# Patient Record
Sex: FEMALE | Race: BLACK OR AFRICAN AMERICAN | NOT HISPANIC OR LATINO | Employment: FULL TIME | URBAN - METROPOLITAN AREA
[De-identification: names, ages, dates, MRNs, and addresses within clinical notes are randomized per-mention and may not be internally consistent; named-entity substitution may affect disease eponyms.]

---

## 2017-02-22 ENCOUNTER — OFFICE VISIT (OUTPATIENT)
Dept: ENDOCRINOLOGY | Facility: MEDICAL CENTER | Age: 52
End: 2017-02-22
Payer: COMMERCIAL

## 2017-02-22 VITALS
WEIGHT: 210 LBS | OXYGEN SATURATION: 90 % | HEART RATE: 84 BPM | BODY MASS INDEX: 33.75 KG/M2 | DIASTOLIC BLOOD PRESSURE: 80 MMHG | SYSTOLIC BLOOD PRESSURE: 142 MMHG | HEIGHT: 66 IN

## 2017-02-22 DIAGNOSIS — E04.2 MULTIPLE THYROID NODULES: ICD-10-CM

## 2017-02-22 PROCEDURE — 99244 OFF/OP CNSLTJ NEW/EST MOD 40: CPT | Performed by: INTERNAL MEDICINE

## 2017-02-22 RX ORDER — ALLOPURINOL 300 MG/1
300 TABLET ORAL DAILY
COMMUNITY
End: 2023-10-04

## 2017-02-22 RX ORDER — CHOLECALCIFEROL (VITAMIN D3) 125 MCG
2000 CAPSULE ORAL DAILY
COMMUNITY

## 2017-02-22 RX ORDER — METOPROLOL SUCCINATE 50 MG/1
50 TABLET, EXTENDED RELEASE ORAL DAILY
COMMUNITY
End: 2023-10-04

## 2017-02-22 RX ORDER — LOSARTAN POTASSIUM 100 MG/1
100 TABLET ORAL DAILY
COMMUNITY

## 2017-02-22 RX ORDER — PRAVASTATIN SODIUM 10 MG
10 TABLET ORAL DAILY
COMMUNITY

## 2017-02-22 NOTE — MR AVS SNAPSHOT
"        Angelica Alberts   2017 3:00 PM   Office Visit   MRN: [id number]    Department:  Endocrinology Med [facility]   Dept Phone:  [phone]    Description:  Female : 1965   Provider:  Timo Harris M.D.           Allergies as of 2017     Allergen Noted Reactions    Penicillins 2017   Hives, Swelling    Sulfa Drugs 2017   Hives, Swelling      Vital Signs     Blood Pressure Pulse Height Weight Body Mass Index Oxygen Saturation    142/80 mmHg 84 1.664 m (5' 5.5\") 95.255 kg (210 lb) 34.40 kg/m2 90%    Smoking Status                   Smoker, Current Status Unknown           Basic Information     Date Of Birth Sex Race Ethnicity Preferred Language    1965 Female Black or  Non- English      Your appointments     Mar 21, 2017  3:20 PM   Established Patient with Timo Harris M.D.   [facility]    [address] [place] [address], [address]  [facility] [id number] [phone]           You will be receiving a confirmation call a few days before your appointment from our automated call confirmation system.              Health Maintenance     Patient has no pending health maintenance at this time      Current Immunizations     No immunizations on file.      Below and/or attached are the medications your provider expects you to take. Review all of your home medications and newly ordered medications with your provider and/or pharmacist. Follow medication instructions as directed by your provider and/or pharmacist. Please keep your medication list with you and share with your provider. Update the information when medications are discontinued, doses are changed, or new medications (including over-the-counter products) are added; and carry medication information at all times in the event of emergency situations     Allergies:  PENICILLINS - Hives,Swelling     SULFA DRUGS - Hives,Swelling               Medications  Valid as of: February " 22, 2017 -  3:44 PM    Generic Name Brand Name Tablet Size Instructions for use    Allopurinol (Tab) ZYLOPRIM 300 MG Take 300 mg by mouth every day.        Cholecalciferol (Tab) Vitamin D3 2000 UNITS Take  by mouth every day.        Losartan Potassium (Tab) COZAAR 100 MG Take 100 mg by mouth every day.        MetFORMIN HCl (Tab) GLUCOPHAGE 1000 MG Take 1,000 mg by mouth 2 times a day, with meals.        Metoprolol Succinate (TABLET SR 24 HR) TOPROL XL 50 MG Take 50 mg by mouth every day.        Pravastatin Sodium (Tab) PRAVACHOL 10 MG Take 10 mg by mouth every day.        .                 Medicines prescribed today were sent to:     [facility]/PHARMACY #[id number] - [place], [place] - [address] [facility] AT [address] [address]    Phone: [phone] Fax: [phone]    Open 24 Hours?: No      Medication refill instructions:       If your prescription bottle indicates you have medication refills left, it is not necessary to call your provider’s office. Please contact your pharmacy and they will refill your medication.    If your prescription bottle indicates you do not have any refills left, you may request refills at any time through one of the following ways: The online Dizkon system (except Urgent Care), by calling your provider’s office, or by asking your pharmacy to contact your provider’s office with a refill request. Medication refills are processed only during regular business hours and may not be available until the next business day. Your provider may request additional information or to have a follow-up visit with you prior to refilling your medication.   *Please Note: Medication refills are assigned a new Rx number when refilled electronically. Your pharmacy may indicate that no refills were authorized even though a new prescription for the same medication is available at the pharmacy. Please request the medicine by name with the pharmacy before contacting your provider for a  refill.           [employer] Access Code: [email or web address]  Expires: 3/24/2017  3:44 PM    [employer]  A secure, online tool to manage your health information     [employer]’s [employer]® is a secure, online tool that connects you to your personalized health information from the privacy of your home -- day or night - making it very easy for you to manage your healthcare. Once the activation process is completed, you can even access your medical information using the [employer] taylor, which is available for free in the Apple Taylor store or Google Play store.     [employer] provides the following levels of access (as shown below):   My Chart Features   [facility] Primary Care Doctor [facility]  Specialists [facility]  Urgent  Care Non-[facility]  Primary Care  Doctor   Email your healthcare team securely and privately 24/7 X X X    Manage appointments: schedule your next appointment; view details of past/upcoming appointments X      Request prescription refills. X      View recent personal medical records, including lab and immunizations X X X X   View health record, including health history, allergies, medications X X X X   Read reports about your outpatient visits, procedures, consult and ER notes X X X X   See your discharge summary, which is a recap of your hospital and/or ER visit that includes your diagnosis, lab results, and care plan. X X       How to register for [employer]:  1. Go to  [email or web address].  2. Click on the Sign Up Now box, which takes you to the New Member Sign Up page. You will need to provide the following information:  a. Enter your [employer] Access Code exactly as it appears at the top of this page. (You will not need to use this code after you’ve completed the sign-up process. If you do not sign up before the expiration date, you must request a new code.)   b. Enter your date of birth.   c. Enter your home email address.   d. Click Submit, and follow the next screen’s instructions.  3. Create a [employer] ID. This will  be your [employer] login ID and cannot be changed, so think of one that is secure and easy to remember.  4. Create a [employer] password. You can change your password at any time.  5. Enter your Password Reset Question and Answer. This can be used at a later time if you forget your password.   6. Enter your e-mail address. This allows you to receive e-mail notifications when new information is available in [employer].  7. Click Sign Up. You can now view your health information.    For assistance activating your [employer] account, call [phone]

## 2017-02-23 NOTE — PROGRESS NOTES
Chief Complaint   Patient presents with   • Thyroid Problem        CHIEF COMPLAINT:      Endocrine evaluation requested by Dr. Powers for this 51 year old lady with thyroid nodules.      History of present illness  The patient was discovered to have thyroid nodules about two years ago.  I do not have the original notes related to that or the original ultrasound done at [facility].  However, a significant nodule was found in the right thyroid lobe which was ultimately biopsied in March 2015.  Result of that biopsy was a benign follicular nodule.  The gland itself has been asymptomatic until recently but causes no local symptoms.  She has been clinically euthyroid taking no thyroid hormone.  She does not have a previous history of any thyroid difficulty.  There is no family history of thyroid problems or thyroid cancer.  She had a follow up thyroid ultrasound done in August 2016.  The nodule that was biopsied in the right lobe had decreased in size down to 6 mm.  There were other scattered small benign cystic nodules in both lobes.  None of the other nodules had significantly increased or change in size or characteristics.  The nodules otherwise have appeared benign.     Over the past one month, the patient has been very aware of a persistent pain and tenderness in the area of the left thyroid lobe.  I can confirm that on examination.  It does not feel particularly enlarged but it is definitely sensitive on palpation in that area.  It also is painful when she rotates her head to the left.  The area of concern seems to be right at the area of the left thyroid lobe.     I have suggested that we expedite a repeat thyroid ultrasound to see if perhaps there is an enlargement of one of the cysts or hemorrhage into a cyst that might account for this discomfort.  That would be the first obvious thing to look for.  If an ultrasound does not indicate the problem, then a CT scan of the area would be next appropriately.      She seems to be euthyroid otherwise.  We have thyroid blood tests and general chemistries done February 7.  Her TSH was 1.0 and free thyroxin mid range at 2.1.  Also TPO antibody was normal at 13 (0-34).  A thyroglobulin antibody also negative.    I will speak with her again after we see a new thyroid ultrasound and make a determination about what to do next.     ROS:   Constitutional  menopausal symptoms   Eyes   vision stable  ENT    no pain or epistaxis, no unusual congestion  Cardiac   no angina, no arrhythmia  Respiratory   no hemoptysis, no unusual dyspnea, no cough  GI    GERD , IBS with constipation     no voiding symptoms  Musculoskeletal    no unusual or new pains  Skin   keratoses  Neuro  no unusual weakness or loss of function  Psych   appears alert and appropriate  Lymph   no new or unusual lumps or nodules      Allergies:   Allergies   Allergen Reactions   • Penicillins Hives and Swelling   • Sulfa Drugs Hives and Swelling       Current medicines including changes today:  Current Outpatient Prescriptions   Medication Sig Dispense Refill   • allopurinol (ZYLOPRIM) 300 MG Tab Take 300 mg by mouth every day.     • metformin (GLUCOPHAGE) 1000 MG tablet Take 1,000 mg by mouth 2 times a day, with meals.     • pravastatin (PRAVACHOL) 10 MG Tab Take 10 mg by mouth every day.     • metoprolol SR (TOPROL XL) 50 MG TABLET SR 24 HR Take 50 mg by mouth every day.     • losartan (COZAAR) 100 MG Tab Take 100 mg by mouth every day.     • Cholecalciferol (VITAMIN D3) 2000 UNITS Tab Take  by mouth every day.       No current facility-administered medications for this visit.        Past Medical History   Diagnosis Date   • Multiple thyroid nodules    • Hypertension    • Diabetes mellitus (CMS-HCC)    • Hyperlipidemia    • S/P cholecystectomy 2005   • S/P hysterectomy 1988       Family history         No family history of thyroid disease or thyroid cancer    Social history          Patient is           Works  "as   [employer]    PHYSICAL EXAM:    /80 mmHg  Pulse 84  Ht 1.664 m (5' 5.5\")  Wt 95.255 kg (210 lb)  BMI 34.40 kg/m2  SpO2 90%    Gen.   appears healthy     Skin   appropriate for sex and age    HEENT  unremarkable    Neck   definite tenderness in the area of the left thyroid lobe. Difficult to palpate an actual nodule or other abnormality in the area    Heart  regular    Extremities  no edema    Neuro  gait and station normal    Psych  appropriate      ASSESSMENT AND RECOMMENDATIONS    1. Multiple thyroid nodules             Pain and tenderness in the area of the left thyroid lobe             Soft tissue neck ultrasound and report results by phone      DISPOSITION: Return in about 1 month (around 3/22/2017).       Timo Harris M.D.    Copies to: Najma Herrera M.D. [phone]  "

## 2017-03-08 ENCOUNTER — TELEPHONE (OUTPATIENT)
Dept: ENDOCRINOLOGY | Facility: MEDICAL CENTER | Age: 52
End: 2017-03-08

## 2017-03-08 NOTE — TELEPHONE ENCOUNTER
----- Message from Timo Harris M.D. sent at 3/7/2017  8:19 PM PST -----  Thyroid subcentimeter nodules are unchanged. We will discuss this at R March 21 appointment.    Timo Harris M.D.

## 2017-03-21 ENCOUNTER — OFFICE VISIT (OUTPATIENT)
Dept: ENDOCRINOLOGY | Facility: MEDICAL CENTER | Age: 52
End: 2017-03-21
Payer: COMMERCIAL

## 2017-03-21 VITALS
WEIGHT: 212 LBS | SYSTOLIC BLOOD PRESSURE: 134 MMHG | HEART RATE: 79 BPM | HEIGHT: 66 IN | BODY MASS INDEX: 34.07 KG/M2 | DIASTOLIC BLOOD PRESSURE: 82 MMHG | OXYGEN SATURATION: 92 %

## 2017-03-21 DIAGNOSIS — M54.2 NECK PAIN ON LEFT SIDE: ICD-10-CM

## 2017-03-21 DIAGNOSIS — E04.2 MULTIPLE THYROID NODULES: ICD-10-CM

## 2017-03-21 DIAGNOSIS — Z13.9 SCREENING PROCEDURE: ICD-10-CM

## 2017-03-21 PROCEDURE — 99213 OFFICE O/P EST LOW 20 MIN: CPT | Performed by: INTERNAL MEDICINE

## 2017-03-21 NOTE — MR AVS SNAPSHOT
"        Angelica Alberts   3/21/2017 3:20 PM   Office Visit   MRN: [id number]    Department:  Endocrinology Med [facility]   Dept Phone:  [phone]    Description:  Female : 1965   Provider:  Timo Harris M.D.           Allergies as of 3/21/2017     Allergen Noted Reactions    Penicillins 2017   Hives, Swelling    Sulfa Drugs 2017   Hives, Swelling      Vital Signs     Blood Pressure Pulse Height Weight Body Mass Index Oxygen Saturation    134/82 mmHg 79 1.664 m (5' 5.5\") 96.163 kg (212 lb) 34.73 kg/m2 92%    Smoking Status                   Smoker, Current Status Unknown           Basic Information     Date Of Birth Sex Race Ethnicity Preferred Language    1965 Female Black or  Non- English      Your appointments     Sep 19, 2017  3:20 PM   Established Patient with Timo Harris M.D.   [facility]    [address] [place] [address], [address]  [facility] [id number] [phone]           You will be receiving a confirmation call a few days before your appointment from our automated call confirmation system.              Problem List              ICD-10-CM Priority Class Noted - Resolved    Multiple thyroid nodules E04.2   2017 - Present      Health Maintenance        Date Due Completion Dates    IMM DTaP/Tdap/Td Vaccine (1 - Tdap) 1984 ---    PAP SMEAR 1986 ---    MAMMOGRAM 2005 ---    COLONOSCOPY 2015 ---    IMM INFLUENZA (1) 2016 ---            Current Immunizations     No immunizations on file.      Below and/or attached are the medications your provider expects you to take. Review all of your home medications and newly ordered medications with your provider and/or pharmacist. Follow medication instructions as directed by your provider and/or pharmacist. Please keep your medication list with you and share with your provider. Update the information when medications are discontinued, doses are changed, " or new medications (including over-the-counter products) are added; and carry medication information at all times in the event of emergency situations     Allergies:  PENICILLINS - Hives,Swelling     SULFA DRUGS - Hives,Swelling               Medications  Valid as of: March 21, 2017 -  3:39 PM    Generic Name Brand Name Tablet Size Instructions for use    Allopurinol (Tab) ZYLOPRIM 300 MG Take 300 mg by mouth every day.        Cholecalciferol (Tab) Vitamin D3 2000 UNITS Take  by mouth every day.        Losartan Potassium (Tab) COZAAR 100 MG Take 100 mg by mouth every day.        MetFORMIN HCl (Tab) GLUCOPHAGE 1000 MG Take 1,000 mg by mouth 2 times a day, with meals.        Metoprolol Succinate (TABLET SR 24 HR) TOPROL XL 50 MG Take 50 mg by mouth every day.        Pravastatin Sodium (Tab) PRAVACHOL 10 MG Take 10 mg by mouth every day.        .                 Medicines prescribed today were sent to:     [facility]/PHARMACY #[id number] - [place], [place] - [address] [facility] AT [address] [address]    Phone: [phone] Fax: [phone]    Open 24 Hours?: No      Medication refill instructions:       If your prescription bottle indicates you have medication refills left, it is not necessary to call your provider’s office. Please contact your pharmacy and they will refill your medication.    If your prescription bottle indicates you do not have any refills left, you may request refills at any time through one of the following ways: The online VMTurbo system (except Urgent Care), by calling your provider’s office, or by asking your pharmacy to contact your provider’s office with a refill request. Medication refills are processed only during regular business hours and may not be available until the next business day. Your provider may request additional information or to have a follow-up visit with you prior to refilling your medication.   *Please Note: Medication refills are assigned a new Rx  number when refilled electronically. Your pharmacy may indicate that no refills were authorized even though a new prescription for the same medication is available at the pharmacy. Please request the medicine by name with the pharmacy before contacting your provider for a refill.           Refresh[employer] Access Code: [email or web address]  Expires: 3/24/2017  4:44 PM    Refresh[employer]  A secure, online tool to manage your health information     [employer]’s Refresh[employer]io® is a secure, online tool that connects you to your personalized health information from the privacy of your home -- day or night - making it very easy for you to manage your healthcare. Once the activation process is completed, you can even access your medical information using the Refresh[employer]io taylor, which is available for free in the Apple Taylor store or Google Play store.     Refresh[employer]io provides the following levels of access (as shown below):   My Chart Features   [facility] Primary Care Doctor [facility]  Specialists [facility]  Urgent  Care Non-[facility]  Primary Care  Doctor   Email your healthcare team securely and privately 24/7 X X X    Manage appointments: schedule your next appointment; view details of past/upcoming appointments X      Request prescription refills. X      View recent personal medical records, including lab and immunizations X X X X   View health record, including health history, allergies, medications X X X X   Read reports about your outpatient visits, procedures, consult and ER notes X X X X   See your discharge summary, which is a recap of your hospital and/or ER visit that includes your diagnosis, lab results, and care plan. X X       How to register for Ref[employer].io:  1. Go to  [email or web address].  2. Click on the Sign Up Now box, which takes you to the New Member Sign Up page. You will need to provide the following information:  a. Enter your Refresh[employer]io Access Code exactly as it appears at the top of this page. (You will not need to use this code after you’ve  completed the sign-up process. If you do not sign up before the expiration date, you must request a new code.)   b. Enter your date of birth.   c. Enter your home email address.   d. Click Submit, and follow the next screen’s instructions.  3. Create a cloudswavet ID. This will be your [employer] login ID and cannot be changed, so think of one that is secure and easy to remember.  4. Create a cloudswavet password. You can change your password at any time.  5. Enter your Password Reset Question and Answer. This can be used at a later time if you forget your password.   6. Enter your e-mail address. This allows you to receive e-mail notifications when new information is available in [employer].  7. Click Sign Up. You can now view your health information.    For assistance activating your [employer] account, call [phone]        Quit Tobacco Information     Do you want to quit using tobacco?    Quitting tobacco decreases risks of cancer, heart and lung disease, increases life expectancy, improves sense of taste and smell, and increases spending money, among other benefits.    If you are thinking about quitting, we can help.  • [facility] Quit Tobacco Program: 402.621.4886  o Program occurs weekly for four weeks and includes pharmacist consultation on products to support quitting smoking or chewing tobacco. A provider referral is needed for pharmacist consultation.  • Tobacco Users Help Hotline: 5-800QUIT-NOW (471-9011) or https://[place].quitlogix.org/  o Free, confidential telephone and online coaching for [place] residents. Sessions are designed on a schedule that is convenient for you. Eligible clients receive free nicotine replacement therapy.  • Nationally: www.smokefree.gov  o Information and professional assistance to support both immediate and long-term needs as you become, and remain, a non-smoker. Smokefree.gov allows you to choose the help that best fits your needs.

## 2017-03-22 NOTE — PROGRESS NOTES
Chief Complaint   Patient presents with   • Follow-Up     neck pain        HPI:    Left neck pain        Patient is still having the discomfort in her left lower neck area. It seems to be a soft tissue area of tenderness lateral to the left lobe of the thyroid and above the clavicle. It hurts more when she rotates her head to the left. That it's also tender when she palpates that area. Neither she nor I can feel a nodule in the area.        The thyroid ultrasound shows small subcentimeter nodules that have not enlarged. This does not show us a cause for her neck discomfort. I would think it might be coming from her cervical spine except for the tenderness in that particular area.         I'm going to have her do a CT scan soft tissue of that area with and without contrast. Her creatinine level is fine. I cautioned her that she will have to stop her metformin after the procedure for a couple of days and then get another creatinine level before resuming.         She also has made an appointment with Dr. Prisca Monet her ENT doctor to follow up results of her CAT scan and maybe he can help determine a cause.    ROS:  All other systems reported as negative or unchanged since last exam       Allergies:   Allergies   Allergen Reactions   • Penicillins Hives and Swelling   • Sulfa Drugs Hives and Swelling       Current medicines including changes today:  Current Outpatient Prescriptions   Medication Sig Dispense Refill   • allopurinol (ZYLOPRIM) 300 MG Tab Take 300 mg by mouth every day.     • metformin (GLUCOPHAGE) 1000 MG tablet Take 1,000 mg by mouth 2 times a day, with meals.     • pravastatin (PRAVACHOL) 10 MG Tab Take 10 mg by mouth every day.     • metoprolol SR (TOPROL XL) 50 MG TABLET SR 24 HR Take 50 mg by mouth every day.     • losartan (COZAAR) 100 MG Tab Take 100 mg by mouth every day.     • Cholecalciferol (VITAMIN D3) 2000 UNITS Tab Take  by mouth every day.       No current facility-administered medications  "for this visit.        Past Medical History   Diagnosis Date   • Multiple thyroid nodules    • Hypertension    • Diabetes mellitus (CMS-HCC)    • Hyperlipidemia    • S/P cholecystectomy 2005   • S/P hysterectomy 1988       PHYSICAL EXAM:    /82 mmHg  Pulse 79  Ht 1.664 m (5' 5.5\")  Wt 96.163 kg (212 lb)  BMI 34.73 kg/m2  SpO2 92%    Gen.   appears healthy     Skin   appropriate for sex and age    HEENT   pharynx is clear    Neck   area definite tenderness just lateral to the thyroid but I don't feel a nodule or swelling. No adenopathy elsewhere in the neck to provide clavicular areas    Heart  regular    Extremities  no edema    Neuro  gait and station normal    Psych  appropriate    ASSESSMENT AND RECOMMENDATIONS    1. Neck pain on left side, ×2 months, unknown etiology    - CT-SOFT TISSUE NECK WITH & W/O; Future    2. Multiple thyroid nodules             Stable, small and not likely a cause of # 1        DISPOSITION: Return in about 6 months (around 9/21/2017).       Timo Harris M.D.    Copies to: Najma Herrera M.D. [phone]                   Dr. Prisca Monet  "

## 2017-03-23 ENCOUNTER — HOSPITAL ENCOUNTER (OUTPATIENT)
Dept: LAB | Facility: MEDICAL CENTER | Age: 52
End: 2017-03-23
Attending: INTERNAL MEDICINE
Payer: COMMERCIAL

## 2017-03-23 DIAGNOSIS — Z13.9 SCREENING PROCEDURE: ICD-10-CM

## 2017-03-23 LAB
CREAT SERPL-MCNC: 0.79 MG/DL (ref 0.5–1.4)
GFR SERPL CREATININE-BSD FRML MDRD: >60 ML/MIN/1.73 M 2

## 2017-03-23 PROCEDURE — 82565 ASSAY OF CREATININE: CPT

## 2017-03-23 PROCEDURE — 36415 COLL VENOUS BLD VENIPUNCTURE: CPT

## 2017-03-29 ENCOUNTER — HOSPITAL ENCOUNTER (OUTPATIENT)
Dept: RADIOLOGY | Facility: MEDICAL CENTER | Age: 52
End: 2017-03-29
Attending: INTERNAL MEDICINE
Payer: COMMERCIAL

## 2017-03-29 DIAGNOSIS — E04.2 MULTIPLE THYROID NODULES: ICD-10-CM

## 2017-03-29 DIAGNOSIS — M54.2 NECK PAIN ON LEFT SIDE: ICD-10-CM

## 2017-03-29 PROCEDURE — 700117 HCHG RX CONTRAST REV CODE 255: Performed by: INTERNAL MEDICINE

## 2017-03-29 PROCEDURE — 70491 CT SOFT TISSUE NECK W/DYE: CPT

## 2017-03-29 RX ADMIN — IOHEXOL 100 ML: 350 INJECTION, SOLUTION INTRAVENOUS at 15:37

## 2017-03-31 ENCOUNTER — TELEPHONE (OUTPATIENT)
Dept: ENDOCRINOLOGY | Facility: MEDICAL CENTER | Age: 52
End: 2017-03-31

## 2017-03-31 DIAGNOSIS — E11.9 DIABETES MELLITUS TYPE 2 IN NONOBESE (HCC): Primary | ICD-10-CM

## 2017-03-31 DIAGNOSIS — E04.2 MULTIPLE THYROID NODULES: ICD-10-CM

## 2017-04-01 NOTE — TELEPHONE ENCOUNTER
Telephone conversation with patient    Informed that soft tissues of the neck on CT scan are negative for abnormalities that might account for her neck pain. She will see Dr. Monet for further evaluation. I will send a copy of lab to Dr. Monet.    She has not restarted her metformin but her sugars are not elevating. We will get another creatinine to make sure it's okay.    Timo Harris M.D.    Copies to Dr. Monet

## 2017-08-29 ENCOUNTER — SLEEP CENTER VISIT (OUTPATIENT)
Dept: SLEEP MEDICINE | Facility: MEDICAL CENTER | Age: 52
End: 2017-08-29
Payer: COMMERCIAL

## 2017-08-29 VITALS
RESPIRATION RATE: 16 BRPM | TEMPERATURE: 97.9 F | HEART RATE: 86 BPM | DIASTOLIC BLOOD PRESSURE: 90 MMHG | HEIGHT: 66 IN | SYSTOLIC BLOOD PRESSURE: 150 MMHG | WEIGHT: 218 LBS | OXYGEN SATURATION: 96 % | BODY MASS INDEX: 35.03 KG/M2

## 2017-08-29 DIAGNOSIS — R06.83 SNORES: ICD-10-CM

## 2017-08-29 PROCEDURE — 99203 OFFICE O/P NEW LOW 30 MIN: CPT | Performed by: NURSE PRACTITIONER

## 2017-08-29 RX ORDER — ASPIRIN 81 MG/1
81 TABLET ORAL DAILY
COMMUNITY

## 2017-08-29 RX ORDER — PAROXETINE 7.5 MG/1
7.5 CAPSULE ORAL DAILY
COMMUNITY

## 2017-08-29 RX ORDER — ZOLPIDEM TARTRATE 5 MG/1
TABLET ORAL
Qty: 3 TAB | Refills: 0 | Status: SHIPPED | OUTPATIENT
Start: 2017-08-29 | End: 2018-02-01

## 2017-08-29 NOTE — PROGRESS NOTES
Chief Complaint   Patient presents with   • Establish Care     Ref By Priya/ Reji Fatigue/ No prev SS         HPI:  This is a 52 y.o. Female,Who is kindly referred by Najma Powers MD for evaluation of possible sleep disordered breathing. The patient has a history of snoring, Daytime sleepiness, and waking up gasping for air. She occasionally has headaches if she does not obtain enough sleep during the night.. She recently had surgery on her thumb and was told that the recovery nurses that she had low oxygen saturations while under anesthesia and abnormal breathing events. She typically goes to bed between 6-8 PM and wakes up between 345-5 AM. She readily falls asleep within 5-10 minutes. She often watches TV while falling asleep. She wakes up 2 times per night for nocturia. She estimates obtaining 8 hours of sleep. She is sometimes tired in the daytime if she had restless sleep. She will sometimes fall asleep when not meaning to. She has no hypnagogic or hypnopompic symptoms. She has no family history of sleep apnea. Other medical history consists of back pain, diabetes mellitus, hypertension, and peptic ulcer disease.The patient is a current every day smoker. She is encouraged to discontinue altogether.     Past Medical History:   Diagnosis Date   • S/P cholecystectomy 2005   • S/P hysterectomy 1988   • Abdominal pain    • Back pain    • Chickenpox    • Constipation    • Cough    • Cystic fibrosis (CMS-HCC)    • Diabetes mellitus (CMS-HCC)    • Frequent headaches    • Frequent urination    • Hyperlipidemia    • Hypertension    • Multiple thyroid nodules    • Nasal drainage    • Painful joint    • Peptic ulcer    • Rhinitis    • Skin change    • Sleep apnea    • Snoring    • Sore muscles    • Sweat, sweating, excessive    • Swelling of lower extremity    • Tonsillitis    • Weakness    • Wears glasses        Past Surgical History:   Procedure Laterality Date   • ABDOMINAL HYSTERECTOMY TOTAL      1988   •  "CHOLECYSTECTOMY      2005   • GASTROSTOMY     • HYSTERECTOMY LAPAROSCOPY     • LAMINOTOMY     • PRIMARY C SECTION     • SINUSCOPE         Social History   Substance Use Topics   • Smoking status: Smoker, Current Status Unknown     Packs/day: 0.25     Years: 35.00     Types: Cigarettes   • Smokeless tobacco: Never Used   • Alcohol use Yes      Comment: 1 to 2 weekly       ROS:   Constitutional: Denies fevers, chills, fatigue, and weight loss.Positive for night sweats  Eyes: Glasses.  Ears/nose/mouth/throat: Denies injury. Positive for rhinitis/nasal congestion  Cardiovascular: Denies chest pain, tightness.  Respiratory: Denies shortness of breath, cough, sputum, wheezing, hemoptysis.  GI: Denies heartburn, difficulty swallowing, nausea, and vomiting.  Neurological: Denies frequent headaches, dizziness, weakness.  Sleep: See history of present illness.    Vitals:  Vitals:    08/29/17 1501   Height: 1.664 m (5' 5.5\")   Weight: 98.9 kg (218 lb)   Weight % change since last entry.: 0 %   BP: 150/90   Pulse: 86   BMI (Calculated): 35.73   Resp: 16   Temp: 36.6 °C (97.9 °F)   O2 sat % room air: 96 %     Allergies:  Penicillins and Sulfa drugs    Medications.  Current Outpatient Prescriptions   Medication Sig Dispense Refill   • sitagliptin (JANUVIA) 100 MG Tab Take 100 mg by mouth every day.     • aspirin 81 MG EC tablet Take  by mouth.     • PARoxetine Mesylate (BRISDELLE) 7.5 MG Cap Take  by mouth.     • zolpidem (AMBIEN) 5 MG Tab Take 1-2 tablets by mouth every evening as needed for insomnia. Bring to sleep study. 3 Tab 0   • allopurinol (ZYLOPRIM) 300 MG Tab Take 300 mg by mouth every day.     • pravastatin (PRAVACHOL) 10 MG Tab Take 10 mg by mouth every day.     • metoprolol SR (TOPROL XL) 50 MG TABLET SR 24 HR Take 50 mg by mouth every day.     • losartan (COZAAR) 100 MG Tab Take 100 mg by mouth every day.     • Cholecalciferol (VITAMIN D3) 2000 UNITS Tab Take  by mouth every day.     • metformin (GLUCOPHAGE) 1000 MG " tablet Take 1,000 mg by mouth 2 times a day, with meals.       No current facility-administered medications for this visit.        PHYSICAL EXAM:  Appearance: Well-developed, well-nourished, no acute distress.  Eyes. PERRL.  Hearing: Grossly intact.  Oropharynx: Tongue normal, posterior pharynx without erythema or exudate.  Respiratory effort: No intercostal retractions or use of accessory muscles.  Lung auscultation: No crackles, wheezing.  Heart auscultation: No murmur, gallop, or rub. Regular rate and rhythm.  Extremities: No cyanosis or edema.  Gait and Station: Normal  Orientation: Oriented to time, place, and person.    Assessment:  1. Snores  POLYSOMNOGRAPHY, 4 OR MORE         Plan:  1. Polysomnogram.  2. Zolpidem 5 mg #3.  3. Patient cautioned on driving or taking care of small children while feeling sleepy and tired.  4. Smoking cessation encouraged.    Return in about 2 months (around 10/29/2017) for After Sleep Study, With FREDDY Back.

## 2017-08-29 NOTE — LETTER
LAKSHMI Billings  [facility]   [address]   [place] [address]  Phone: [phone] - Fax: [phone]           Encounter Date: 8/29/2017  Provider: LAKSHMI Billings  Location of Care: [facility]      Patient:   Angelica Alberts   MR Number: [id number]   YOB: 1965     PROGRESS NOTE:  Chief Complaint   Patient presents with   • Establish Care     Ref By Priya/ Reji Fatigue/ No prev SS         HPI:  This is a 52 y.o. Female,Who is kindly referred by Najma Powers MD for evaluation of possible sleep disordered breathing. The patient has a history of snoring, Daytime sleepiness, and waking up gasping for air. She occasionally has headaches if she does not obtain enough sleep during the night.. She recently had surgery on her thumb and was told that the recovery nurses that she had low oxygen saturations while under anesthesia and abnormal breathing events. She typically goes to bed between 6-8 PM and wakes up between 345-5 AM. She readily falls asleep within 5-10 minutes. She often watches TV while falling asleep. She wakes up 2 times per night for nocturia. She estimates obtaining 8 hours of sleep. She is sometimes tired in the daytime if she had restless sleep. She will sometimes fall asleep when not meaning to. She has no hypnagogic or hypnopompic symptoms. She has no family history of sleep apnea. Other medical history consists of back pain, diabetes mellitus, hypertension, and peptic ulcer disease.    Past Medical History:   Diagnosis Date   • S/P cholecystectomy 2005   • S/P hysterectomy 1988   • Abdominal pain    • Back pain    • Chickenpox    • Constipation    • Cough    • Cystic fibrosis (CMS-HCC)    • Diabetes mellitus (CMS-HCC)    • Frequent headaches    • Frequent urination    • Hyperlipidemia    • Hypertension    • Multiple thyroid nodules    • Nasal drainage    • Painful joint    • Peptic  "ulcer    • Rhinitis    • Skin change    • Sleep apnea    • Snoring    • Sore muscles    • Sweat, sweating, excessive    • Swelling of lower extremity    • Tonsillitis    • Weakness    • Wears glasses        Past Surgical History:   Procedure Laterality Date   • ABDOMINAL HYSTERECTOMY TOTAL      1988   • CHOLECYSTECTOMY      2005   • GASTROSTOMY     • HYSTERECTOMY LAPAROSCOPY     • LAMINOTOMY     • PRIMARY C SECTION     • SINUSCOPE         Social History   Substance Use Topics   • Smoking status: Smoker, Current Status Unknown     Packs/day: 0.25     Years: 35.00     Types: Cigarettes   • Smokeless tobacco: Never Used   • Alcohol use Yes      Comment: 1 to 2 weekly       ROS:   Constitutional: Denies fevers, chills, fatigue, and weight loss.Positive for night sweats  Eyes: Glasses.  Ears/nose/mouth/throat: Denies injury. Positive for rhinitis/nasal congestion  Cardiovascular: Denies chest pain, tightness.  Respiratory: Denies shortness of breath, cough, sputum, wheezing, hemoptysis.  GI: Denies heartburn, difficulty swallowing, nausea, and vomiting.  Neurological: Denies frequent headaches, dizziness, weakness.  Sleep: See history of present illness.    Vitals:  Vitals:    08/29/17 1501   Height: 1.664 m (5' 5.5\")   Weight: 98.9 kg (218 lb)   Weight % change since last entry.: 0 %   BP: 150/90   Pulse: 86   BMI (Calculated): 35.73   Resp: 16   Temp: 36.6 °C (97.9 °F)   O2 sat % room air: 96 %     Allergies:  Penicillins and Sulfa drugs    Medications.  Current Outpatient Prescriptions   Medication Sig Dispense Refill   • sitagliptin (JANUVIA) 100 MG Tab Take 100 mg by mouth every day.     • aspirin 81 MG EC tablet Take  by mouth.     • PARoxetine Mesylate (BRISDELLE) 7.5 MG Cap Take  by mouth.     • zolpidem (AMBIEN) 5 MG Tab Take 1-2 tablets by mouth every evening as needed for insomnia. Bring to sleep study. 3 Tab 0   • allopurinol (ZYLOPRIM) 300 MG Tab Take 300 mg by mouth every day.     • pravastatin (PRAVACHOL) " 10 MG Tab Take 10 mg by mouth every day.     • metoprolol SR (TOPROL XL) 50 MG TABLET SR 24 HR Take 50 mg by mouth every day.     • losartan (COZAAR) 100 MG Tab Take 100 mg by mouth every day.     • Cholecalciferol (VITAMIN D3) 2000 UNITS Tab Take  by mouth every day.     • metformin (GLUCOPHAGE) 1000 MG tablet Take 1,000 mg by mouth 2 times a day, with meals.       No current facility-administered medications for this visit.        PHYSICAL EXAM:  Appearance: Well-developed, well-nourished, no acute distress.  Eyes. PERRL.  Hearing: Grossly intact.  Oropharynx: Tongue normal, posterior pharynx without erythema or exudate.  Respiratory effort: No intercostal retractions or use of accessory muscles.  Lung auscultation: No crackles, wheezing.  Heart auscultation: No murmur, gallop, or rub. Regular rate and rhythm.  Extremities: No cyanosis or edema.  Gait and Station: Normal  Orientation: Oriented to time, place, and person.    Assessment:  1. Snores  POLYSOMNOGRAPHY, 4 OR MORE         Plan:  1. Polysomnogram.  2. Zolpidem 5 mg #3.  3. Patient cautioned on driving or taking care of small children while feeling sleepy and tired.    Return in about 2 months (around 10/29/2017) for After Sleep Study, With FREDDY Back.        Electronically signed by LAKSHMI Billings  on 08/29/17    Najma Herrera M.D.  [address]  [place] [address]  VIA Facsimile: [phone]

## 2017-08-29 NOTE — PATIENT INSTRUCTIONS
"1. Polysomnogram.  2. Zolpidem 5 mg #3.  3. Patient cautioned on driving or taking care of small children while feeling sleepy and tired.  4. Smoking cessation encouraged.        You Can Quit Smoking  If you are ready to quit smoking or are thinking about it, congratulations! You have chosen to help yourself be healthier and live longer! There are lots of different ways to quit smoking. Nicotine gum, nicotine patches, a nicotine inhaler, or nicotine nasal spray can help with physical craving. Hypnosis, support groups, and medicines help break the habit of smoking.  TIPS TO GET OFF AND STAY OFF CIGARETTES  · Learn to predict your moods. Do not let a bad situation be your excuse to have a cigarette. Some situations in your life might tempt you to have a cigarette.  · Ask friends and co-workers not to smoke around you.  · Make your home smoke-free.  · Never have \"just one\" cigarette. It leads to wanting another and another. Remind yourself of your decision to quit.  · On a card, make a list of your reasons for not smoking. Read it at least the same number of times a day as you have a cigarette. Tell yourself everyday, \"I do not want to smoke. I choose not to smoke.\"  · Ask someone at home or work to help you with your plan to quit smoking.  · Have something planned after you eat or have a cup of coffee. Take a walk or get other exercise to perk you up. This will help to keep you from overeating.  · Try a relaxation exercise to calm you down and decrease your stress. Remember, you may be tense and nervous the first two weeks after you quit. This will pass.  · Find new activities to keep your hands busy. Play with a pen, coin, or rubber band. Doodle or draw things on paper.  · Brush your teeth right after eating. This will help cut down the craving for the taste of tobacco after meals. You can try mouthwash too.  · Try gum, breath mints, or diet candy to keep something in your mouth.  IF YOU SMOKE AND WANT TO QUIT:  · Do " "not stock up on cigarettes. Never buy a carton. Wait until one pack is finished before you buy another.  · Never carry cigarettes with you at work or at home.  · Keep cigarettes as far away from you as possible. Leave them with someone else.  · Never carry matches or a lighter with you.  · Ask yourself, \"Do I need this cigarette or is this just a reflex?\"  · Bet with someone that you can quit. Put cigarette money in a [employer] bank every morning. If you smoke, you give up the money. If you do not smoke, by the end of the week, you keep the money.  · Keep trying. It takes 21 days to change a habit!  · Talk to your doctor about using medicines to help you quit. These include nicotine replacement gum, lozenges, or skin patches.     This information is not intended to replace advice given to you by your health care provider. Make sure you discuss any questions you have with your health care provider.     Document Released: 10/14/2010 Document Revised: 03/11/2013 Document Reviewed: 10/14/2010  [employer] Interactive Patient Education ©2016 [employer]    "

## 2017-10-30 ENCOUNTER — TELEPHONE (OUTPATIENT)
Dept: SLEEP MEDICINE | Facility: MEDICAL CENTER | Age: 52
End: 2017-10-30

## 2017-10-30 DIAGNOSIS — G47.33 OSA (OBSTRUCTIVE SLEEP APNEA): ICD-10-CM

## 2017-11-07 ENCOUNTER — HOME STUDY (OUTPATIENT)
Dept: SLEEP MEDICINE | Facility: MEDICAL CENTER | Age: 52
End: 2017-11-07
Attending: NURSE PRACTITIONER
Payer: COMMERCIAL

## 2017-11-07 DIAGNOSIS — G47.33 OSA (OBSTRUCTIVE SLEEP APNEA): ICD-10-CM

## 2017-11-07 PROCEDURE — 95806 SLEEP STUDY UNATT&RESP EFFT: CPT | Performed by: FAMILY MEDICINE

## 2017-11-11 NOTE — PROCEDURES
DIAGNOSTIC HOME SLEEP TEST (HST) REPORT       PATIENT ID:  NAME:  Angelica Alberts  MRN:               [id number]  YOB: 1965  DATE OF STUDY: 11/7/17      Impression:     This study shows evidence of:    1. Moderate  obstructive sleep apnea with  Respiratory Event Index (KAIDEN) of 19.8 per hour . These findings are based on the recording time (flow evaluation time). It is not possible with this device to determine a traditional apnea+hypopnea index (AHI) for total sleep time since EEG channels are not available.   O2 Sat. amy was 78% and mean O2 sat was 90% and baseline O2 at 94 %. O2 sat was below 90% for 72% of the flow evaluation time. Oxygen Desaturation (>=3%) Index was elevated at 25.8/hr.       TECHNICAL DESCRIPTION:  [employer] Device used was a type-III home study device. Home sleep study recording included: Airflow recording by nasal pressure transducer; Respiratory Effort by abdominal Respiratory Bands; O2 by finger oximetry. A position sensor and a snore channel was also used.    Scoring Criteria: A modification of the the AASM Manual for the Scoring of Sleep and Associated Events, 2012, was used.   Obstructive apnea was scored by cessation of airflow for at least 10 seconds with continuing respiratory effort.  Central apnea was scored by cessation of airflow for at least 10 seconds with no effort.  Hypopnea was scored by a 30% or more reduction in airflow for at least 10 seconds accompanied by an arterial oxygen desaturation of 3% or more.  (For Medicare patients, hypopneas were scored by a 30% or more reduction in airflow for at least 10 seconds accompanied by an arterial oxygen saturation of 4% or more, as required by their insurance, CMS.      INDICATION: The patient has a history of snoring, Daytime sleepiness, and waking up gasping for air.      General sleep summary: . Total recording time is 7 hours and 56 minutes and total flow evaluation time is 7 hours and 44 minutes. The  patient spent 7 hours and 39 minutes in the supine position and 0 hours and 3 minutes in the nonsupine position.    Respiratory events:    Apneas: 1 (Obstructive apnea index 0/hr, Central apnea index 0.1 /hr, mixed 0 /hour)  Hypopneas: 152    Recommendations:    1. CPAP titration study.   2. In general patients with sleep apnea are advised to avoid alcohol and sedatives and to not operate a motor vehicle while drowsy. In some cases alternative treatment options may prove effective in resolving sleep apnea in these options include upper airway surgery, the use of a dental orthotic or weight loss and positional therapy. Clinical correlation is required.        3. Weight Loss  Dusty Castillo MD

## 2017-11-21 ENCOUNTER — APPOINTMENT (OUTPATIENT)
Dept: SLEEP MEDICINE | Facility: MEDICAL CENTER | Age: 52
End: 2017-11-21
Payer: COMMERCIAL

## 2018-02-01 ENCOUNTER — SLEEP CENTER VISIT (OUTPATIENT)
Dept: SLEEP MEDICINE | Facility: MEDICAL CENTER | Age: 53
End: 2018-02-01
Payer: COMMERCIAL

## 2018-02-01 VITALS
DIASTOLIC BLOOD PRESSURE: 78 MMHG | HEART RATE: 74 BPM | WEIGHT: 208 LBS | OXYGEN SATURATION: 93 % | HEIGHT: 66 IN | BODY MASS INDEX: 33.43 KG/M2 | SYSTOLIC BLOOD PRESSURE: 120 MMHG | RESPIRATION RATE: 16 BRPM | TEMPERATURE: 97.2 F

## 2018-02-01 DIAGNOSIS — G47.33 OSA (OBSTRUCTIVE SLEEP APNEA): ICD-10-CM

## 2018-02-01 PROCEDURE — 99213 OFFICE O/P EST LOW 20 MIN: CPT | Performed by: NURSE PRACTITIONER

## 2018-02-01 RX ORDER — DAPAGLIFLOZIN 10 MG/1
10 TABLET, FILM COATED ORAL DAILY
COMMUNITY
Start: 2017-12-21

## 2018-02-01 NOTE — PATIENT INSTRUCTIONS
1) Continue autoCPAP at [id number]  2) Discussed acclimating to machine and change mask within first 30 days if required. Bring machine to first appointment.  3) Vaccines: Hand washing encouraged  4) Return in about 2 months (around 4/1/2018) for Compliance, review of symptoms, if not sooner, follow up with FREDDY Guevara.

## 2018-02-01 NOTE — PROGRESS NOTES
CC:  Here for f/u sleep issues as listed below    HPI:   Angelica presents today for follow up obstructive sleep apnea and sleep study .  HST from 11/2017 indicated an AHI of 19.8 and low oxygenation of 78%.  Reviewed results and treatment options including CPAP treatment versus in lab titration, dental appliance, UPPP surgery, and behavioral modifications.  Patient is amendable to autotherapy. They understand they may need a future sleep study if treatment is ineffective.   Patient is currently sleeping 8-10 hours per night with 2 nighttime awakenings. They have no trouble falling asleep.   They sometimes feel refreshed in the morning and denies morning H/A. They feel tired throughout the day and denies nap.  Patient reports snoring, apnea events and paroxysmal nocturnal dyspnea events. They have never fallen asleep in conversation, at the wheel, or at work.  They deny sleepwalking/talking. Patient is a current smoker, 1/2 pack/day. She is not interested in quitting at this time, but understands the benefits.       Patient Active Problem List    Diagnosis Date Noted   • BMI 34.0-34.9,adult 02/01/2018   • BRENNA (obstructive sleep apnea) 02/01/2018   • Snores 08/29/2017   • Multiple thyroid nodules 02/22/2017       Past Medical History:   Diagnosis Date   • Abdominal pain    • Back pain    • Chickenpox    • Constipation    • Cough    • Cystic fibrosis (CMS-HCC)    • Diabetes mellitus (CMS-HCC)    • Frequent headaches    • Frequent urination    • Hyperlipidemia    • Hypertension    • Multiple thyroid nodules    • Nasal drainage    • Painful joint    • Peptic ulcer    • Rhinitis    • S/P cholecystectomy 2005   • S/P hysterectomy 1988   • Skin change    • Sleep apnea    • Snoring    • Sore muscles    • Sweat, sweating, excessive    • Swelling of lower extremity    • Tonsillitis    • Weakness    • Wears glasses        Past Surgical History:   Procedure Laterality Date   • ABDOMINAL HYSTERECTOMY TOTAL      1988   •  CHOLECYSTECTOMY      2005   • GASTROSTOMY     • HYSTERECTOMY LAPAROSCOPY     • LAMINOTOMY     • PRIMARY C SECTION     • SINUSCOPE         Family History   Problem Relation Age of Onset   • Hypertension Mother    • Diabetes Mother    • Diabetes Brother    • Hypertension Brother    • Diabetes Brother    • Cancer Sister        Social History     Social History   • Marital status:      Spouse name: N/A   • Number of children: N/A   • Years of education: N/A     Occupational History   • Not on file.     Social History Main Topics   • Smoking status: Smoker, Current Status Unknown     Packs/day: 0.25     Years: 35.00     Types: Cigarettes   • Smokeless tobacco: Never Used   • Alcohol use No      Comment: once a month    • Drug use: Yes     Types: Marijuana      Comment: Occationally   • Sexual activity: Not on file     Other Topics Concern   • Not on file     Social History Narrative   • No narrative on file       Current Outpatient Prescriptions   Medication Sig Dispense Refill   • FARXIGA 10 MG Tab Take 10 mg by mouth every day.     • metFORMIN (GLUCOPHAGE) 500 MG Tab Take 500 mg by mouth 2 Times a Day.     • sitagliptin (JANUVIA) 100 MG Tab Take 100 mg by mouth every day.     • aspirin 81 MG EC tablet Take 81 mg by mouth every day.     • PARoxetine Mesylate (BRISDELLE) 7.5 MG Cap Take 7.5 mg by mouth every day.     • allopurinol (ZYLOPRIM) 300 MG Tab Take 300 mg by mouth every day.     • pravastatin (PRAVACHOL) 10 MG Tab Take 10 mg by mouth every day.     • metoprolol SR (TOPROL XL) 50 MG TABLET SR 24 HR Take 50 mg by mouth every day.     • losartan (COZAAR) 100 MG Tab Take 100 mg by mouth every day.     • Cholecalciferol (VITAMIN D3) 2000 UNITS Tab Take 2,000 Units by mouth every day.     • metformin (GLUCOPHAGE) 1000 MG tablet Take 1,000 mg by mouth 2 times a day, with meals.       No current facility-administered medications for this visit.           Allergies: Penicillins and Sulfa drugs      ROS   Gen:  "Denies fever, chills, unintentional weight loss, fatigue  Resp:Denies Dyspnea  CV: Denies chest pain, chest tightness  Sleep:Denies morning headache, insomnia,   Neuro: Denies frequent headaches, weakness, dizziness  See HPI.  All other systems reviewed and negative        Vital signs for this encounter:  Vitals:    02/01/18 1504   Height: 1.664 m (5' 5.5\")   Weight: 94.3 kg (208 lb)   Weight % change since last entry.: 0 %   BP: 120/78   Pulse: 74   BMI (Calculated): 34.09   Resp: 16   Temp: 36.2 °C (97.2 °F)   O2 sat % room air: 93 %                   Physical Exam:   Gen:         Alert and oriented, No apparent distress.   Neck:        No Lymphadenopathy.  Lungs:     Clear to auscultation bilaterally.    CV:          Regular rate and rhythm. No murmurs, rubs or gallops.   Abd:         Soft non tender, non distended.            Ext:          No clubbing, cyanosis, edema.    Assessment   1. BRENNA (obstructive sleep apnea)  DME CPAP    OBESITY COUNSELING (No Charge): Patient identified as having weight management issue.  Appropriate orders and counseling given.   2. BMI 34.0-34.9,adult  OBESITY COUNSELING (No Charge): Patient identified as having weight management issue.  Appropriate orders and counseling given.       PLAN:   Patient Instructions   1) Continue autoCPAP at [id number]  2) Discussed acclimating to machine and change mask within first 30 days if required. Bring machine to first appointment.  3) Vaccines: Hand washing encouraged  4) Return in about 2 months (around 4/1/2018) for Compliance, review of symptoms, if not sooner, follow up with FREDDY Guevara.        "

## 2018-03-16 ENCOUNTER — SLEEP CENTER VISIT (OUTPATIENT)
Dept: SLEEP MEDICINE | Facility: MEDICAL CENTER | Age: 53
End: 2018-03-16
Payer: COMMERCIAL

## 2018-03-16 VITALS
TEMPERATURE: 98.1 F | HEART RATE: 89 BPM | RESPIRATION RATE: 15 BRPM | HEIGHT: 65 IN | WEIGHT: 210 LBS | SYSTOLIC BLOOD PRESSURE: 118 MMHG | DIASTOLIC BLOOD PRESSURE: 70 MMHG | BODY MASS INDEX: 34.99 KG/M2 | OXYGEN SATURATION: 90 %

## 2018-03-16 DIAGNOSIS — G47.33 OSA (OBSTRUCTIVE SLEEP APNEA): ICD-10-CM

## 2018-03-16 PROCEDURE — 99213 OFFICE O/P EST LOW 20 MIN: CPT | Performed by: INTERNAL MEDICINE

## 2018-03-16 NOTE — PROGRESS NOTES
Chief Complaint   Patient presents with   • Follow-Up     COMPLIENCE DL        HPI: This patient is a 52 y.o. Female who returns for first CPAP compliance check. Home sleep study from November 2017 showed moderately severe BRENNA with AHI 28 events per hour. She was started on AutoPap 5-15 cm of water with compliance card confirming 100% CPAP usage for over 8 hours nightly. Her mean pressures are 8 cm of water and average AHI is normal at 2.2. She states CPAP has made a dramatic improvement in her sleep quality and daytime fatigue. She is now sleeping well through the night and awakening feeling rested. She feels she is benefiting tremendously from CPAP use. She is using a fullface mask which has been leaking mildly however has a replacement mask is ordered to arrive in the next few days.      Past Medical History:   Diagnosis Date   • Abdominal pain    • Back pain    • Chickenpox    • Constipation    • Cough    • Cystic fibrosis (CMS-HCC)    • Diabetes mellitus (CMS-HCC)    • Frequent headaches    • Frequent urination    • Hyperlipidemia    • Hypertension    • Multiple thyroid nodules    • Nasal drainage    • Painful joint    • Peptic ulcer    • Rhinitis    • S/P cholecystectomy 2005   • S/P hysterectomy 1988   • Skin change    • Sleep apnea    • Snoring    • Sore muscles    • Sweat, sweating, excessive    • Swelling of lower extremity    • Tonsillitis    • Weakness    • Wears glasses        Social History     Social History   • Marital status:      Spouse name: N/A   • Number of children: N/A   • Years of education: N/A     Occupational History   • Not on file.     Social History Main Topics   • Smoking status: Current Every Day Smoker     Packs/day: 0.25     Years: 35.00     Types: Cigarettes   • Smokeless tobacco: Never Used   • Alcohol use No      Comment: once a month    • Drug use: Yes     Types: Marijuana      Comment: Occationally   • Sexual activity: Not on file     Other Topics Concern   • Not on file      Social History Narrative   • No narrative on file       Family History   Problem Relation Age of Onset   • Hypertension Mother    • Diabetes Mother    • Diabetes Brother    • Hypertension Brother    • Diabetes Brother    • Cancer Sister        Current Outpatient Prescriptions on File Prior to Visit   Medication Sig Dispense Refill   • FARXIGA 10 MG Tab Take 10 mg by mouth every day.     • metFORMIN (GLUCOPHAGE) 500 MG Tab Take 500 mg by mouth 2 Times a Day.     • sitagliptin (JANUVIA) 100 MG Tab Take 100 mg by mouth every day.     • aspirin 81 MG EC tablet Take 81 mg by mouth every day.     • PARoxetine Mesylate (BRISDELLE) 7.5 MG Cap Take 7.5 mg by mouth every day.     • allopurinol (ZYLOPRIM) 300 MG Tab Take 300 mg by mouth every day.     • pravastatin (PRAVACHOL) 10 MG Tab Take 10 mg by mouth every day.     • metoprolol SR (TOPROL XL) 50 MG TABLET SR 24 HR Take 50 mg by mouth every day.     • losartan (COZAAR) 100 MG Tab Take 100 mg by mouth every day.     • Cholecalciferol (VITAMIN D3) 2000 UNITS Tab Take 2,000 Units by mouth every day.     • metformin (GLUCOPHAGE) 1000 MG tablet Take 1,000 mg by mouth 2 times a day, with meals.       No current facility-administered medications on file prior to visit.        Allergies: Penicillins and Sulfa drugs    ROS:   Constitutional: Denies fevers, chills, night sweats, fatigue or weight loss  Eyes: Denies vision loss, pain, drainage, double vision  Ears, Nose, Throat: Denies earache, difficulty hearing, tinnitus, nasal congestion, hoarseness  Cardiovascular: Denies chest pain, tightness, palpitations, orthopnea or edema  Respiratory: Denies shortness of breath, cough, wheezing, hemoptysis  Sleep: Denies daytime sleepiness, snoring, apneas, insomnia, morning headaches  GI: Denies heartburn, dysphagia, nausea, abdominal pain, diarrhea or constipation  : Denies frequent urination, hematuria, discharge or painful urination  Musculoskeletal: Denies back pain, painful  "joints, sore muscles  Neurological: Denies weakness or headaches  Skin: No rashes    Blood pressure 118/70, pulse 89, temperature 36.7 °C (98.1 °F), resp. rate 15, height 1.651 m (5' 5\"), weight 95.3 kg (210 lb), SpO2 90 %.    Physical Exam:  Appearance: Well-nourished, well-developed, in no acute distress  HEENT: Normocephalic, atraumatic, white sclera, PERRLA, Mallampati 3  Neck: No adenopathy or masses  Respiratory: no intercostal retractions or accessory muscle use  Lungs auscultation: Clear to auscultation bilaterally  Cardiovascular: Regular rate rhythm. No murmurs, rubs or gallops.  No LE edema  Abdomen: soft, nondistended  Gait: Normal  Digits: No clubbing, cyanosis  Motor: No focal deficits  Orientation: Oriented to time, person and place    Diagnosis:  1. BRENNA (obstructive sleep apnea)     2. BMI 34.0-34.9,adult         Plan:  The patient shows excellent compliance and response to CPAP therapy. She is benefiting tremendously from therapy. Maintain AutoPap 5-15 cm of water. Replace fullface mask q 3 months.   Weight loss encouraged.  Return in about 6 months (around 9/16/2018).      "

## 2020-09-14 ENCOUNTER — APPOINTMENT (RX ONLY)
Dept: URBAN - METROPOLITAN AREA CLINIC 61 | Facility: CLINIC | Age: 55
Setting detail: DERMATOLOGY
End: 2020-09-14

## 2020-09-14 DIAGNOSIS — D22 MELANOCYTIC NEVI: ICD-10-CM

## 2020-09-14 DIAGNOSIS — D485 NEOPLASM OF UNCERTAIN BEHAVIOR OF SKIN: ICD-10-CM

## 2020-09-14 PROBLEM — D48.5 NEOPLASM OF UNCERTAIN BEHAVIOR OF SKIN: Status: ACTIVE | Noted: 2020-09-14

## 2020-09-14 PROBLEM — D22.62 MELANOCYTIC NEVI OF LEFT UPPER LIMB, INCLUDING SHOULDER: Status: ACTIVE | Noted: 2020-09-14

## 2020-09-14 PROCEDURE — ? COUNSELING

## 2020-09-14 PROCEDURE — 11102 TANGNTL BX SKIN SINGLE LES: CPT

## 2020-09-14 PROCEDURE — 99202 OFFICE O/P NEW SF 15 MIN: CPT | Mod: 25

## 2020-09-14 PROCEDURE — ? BIOPSY BY SHAVE METHOD

## 2020-09-14 ASSESSMENT — LOCATION SIMPLE DESCRIPTION DERM
LOCATION SIMPLE: LEFT FOREARM
LOCATION SIMPLE: LEFT BREAST
LOCATION SIMPLE: LEFT BREAST
LOCATION SIMPLE: LEFT FOREARM

## 2020-09-14 ASSESSMENT — LOCATION DETAILED DESCRIPTION DERM
LOCATION DETAILED: LEFT PROXIMAL DORSAL FOREARM
LOCATION DETAILED: LEFT LATERAL BREAST 4-5:00 REGION
LOCATION DETAILED: LEFT LATERAL BREAST 5-6:00 REGION
LOCATION DETAILED: LEFT LATERAL BREAST 4-5:00 REGION
LOCATION DETAILED: LEFT PROXIMAL DORSAL FOREARM

## 2020-09-14 ASSESSMENT — LOCATION ZONE DERM
LOCATION ZONE: TRUNK
LOCATION ZONE: ARM
LOCATION ZONE: TRUNK
LOCATION ZONE: ARM

## 2020-09-14 NOTE — PROCEDURE: BIOPSY BY SHAVE METHOD
Body Location Override (Optional - Billing Will Still Be Based On Selected Body Map Location If Applicable): Left Inferior Breast
Detail Level: Detailed
Depth Of Biopsy: dermis
Was A Bandage Applied: Yes
Size Of Lesion In Cm: 0.6
X Size Of Lesion In Cm: 0
Biopsy Type: H and E
Biopsy Method: Dermablade
Anesthesia Type: 1% lidocaine with epinephrine
Anesthesia Volume In Cc (Will Not Render If 0): 1.1
Hemostasis: Electrocautery
Wound Care: Bacitracin
Dressing: Band-Aid
Destruction After The Procedure: No
Type Of Destruction Used: Curettage
Curettage Text: The wound bed was treated with curettage after the biopsy was performed.
Cryotherapy Text: The wound bed was treated with cryotherapy after the biopsy was performed.
Electrodesiccation Text: The wound bed was treated with electrodesiccation after the biopsy was performed.
Electrodesiccation And Curettage Text: The wound bed was treated with electrodesiccation and curettage after the biopsy was performed.
Silver Nitrate Text: The wound bed was treated with silver nitrate after the biopsy was performed.
Lab: [id number]
Path Notes (To The Dermatopathologist): Size: 0.6 cm R/O: DN
Consent: Written consent was obtained and risks were reviewed including but not limited to scarring, infection, bleeding, scabbing, incomplete removal, nerve damage and allergy to anesthesia.
Post-Care Instructions: I reviewed with the patient in detail post-care instructions. Patient is to keep the biopsy site dry overnight, and then apply bacitracin twice daily until healed. Patient may apply hydrogen peroxide soaks to remove any crusting.
Notification Instructions: Patient will be notified of biopsy results. However, patient instructed to call the office if not contacted within 2 weeks.
Billing Type: [employer]
Information: Selecting Yes will display possible errors in your note based on the variables you have selected. This validation is only offered as a suggestion for you. PLEASE NOTE THAT THE VALIDATION TEXT WILL BE REMOVED WHEN YOU FINALIZE YOUR NOTE. IF YOU WANT TO FAX A PRELIMINARY NOTE YOU WILL NEED TO TOGGLE THIS TO 'NO' IF YOU DO NOT WANT IT IN YOUR FAXED NOTE.

## 2020-09-14 NOTE — PROCEDURE: MIPS QUALITY
Detail Level: Detailed
Quality 402: Tobacco Use And Help With Quitting Among Adolescents: Patient screened for tobacco and is a smoker AND received Cessation Counseling
Quality 431: Preventive Care And Screening: Unhealthy Alcohol Use - Screening: Patient screened for unhealthy alcohol use using a single question and scores less than 2 times per year
Quality 110: Preventive Care And Screening: Influenza Immunization: Influenza Immunization Administered during Influenza season

## 2023-10-04 PROBLEM — M19.041 PRIMARY OSTEOARTHRITIS OF RIGHT HAND: Status: ACTIVE | Noted: 2023-10-04
